# Patient Record
Sex: FEMALE | Race: WHITE | Employment: UNEMPLOYED | URBAN - METROPOLITAN AREA
[De-identification: names, ages, dates, MRNs, and addresses within clinical notes are randomized per-mention and may not be internally consistent; named-entity substitution may affect disease eponyms.]

---

## 2023-05-11 ENCOUNTER — OFFICE VISIT (OUTPATIENT)
Dept: PRIMARY CARE CLINIC | Age: 63
End: 2023-05-11

## 2023-05-11 VITALS
DIASTOLIC BLOOD PRESSURE: 90 MMHG | TEMPERATURE: 97.1 F | HEART RATE: 98 BPM | HEIGHT: 65 IN | SYSTOLIC BLOOD PRESSURE: 160 MMHG | OXYGEN SATURATION: 100 % | BODY MASS INDEX: 29.46 KG/M2 | WEIGHT: 176.8 LBS

## 2023-05-11 DIAGNOSIS — Z76.89 ENCOUNTER TO ESTABLISH CARE: Primary | ICD-10-CM

## 2023-05-11 DIAGNOSIS — G47.00 INSOMNIA, UNSPECIFIED TYPE: ICD-10-CM

## 2023-05-11 DIAGNOSIS — E11.65 UNCONTROLLED TYPE 2 DIABETES MELLITUS WITH HYPERGLYCEMIA (HCC): ICD-10-CM

## 2023-05-11 DIAGNOSIS — F41.9 ANXIETY: ICD-10-CM

## 2023-05-11 DIAGNOSIS — I10 ESSENTIAL HYPERTENSION: ICD-10-CM

## 2023-05-11 PROBLEM — Z79.4 DIABETES MELLITUS TYPE 2, INSULIN DEPENDENT (HCC): Status: ACTIVE | Noted: 2023-05-11

## 2023-05-11 PROBLEM — N20.0 LEFT NEPHROLITHIASIS: Status: ACTIVE | Noted: 2021-01-22

## 2023-05-11 PROBLEM — E11.9 DIABETES MELLITUS TYPE 2, INSULIN DEPENDENT (HCC): Status: ACTIVE | Noted: 2023-05-11

## 2023-05-11 PROCEDURE — 99203 OFFICE O/P NEW LOW 30 MIN: CPT | Performed by: FAMILY MEDICINE

## 2023-05-11 PROCEDURE — 3077F SYST BP >= 140 MM HG: CPT | Performed by: FAMILY MEDICINE

## 2023-05-11 PROCEDURE — 3080F DIAST BP >= 90 MM HG: CPT | Performed by: FAMILY MEDICINE

## 2023-05-11 RX ORDER — TRAZODONE HYDROCHLORIDE 50 MG/1
50 TABLET ORAL NIGHTLY
COMMUNITY

## 2023-05-11 RX ORDER — GLIMEPIRIDE 4 MG/1
4 TABLET ORAL
COMMUNITY

## 2023-05-11 RX ORDER — CETIRIZINE HYDROCHLORIDE 10 MG/1
10 TABLET ORAL DAILY
COMMUNITY

## 2023-05-11 RX ORDER — SIMVASTATIN 20 MG
20 TABLET ORAL NIGHTLY
COMMUNITY

## 2023-05-11 RX ORDER — AMITRIPTYLINE HYDROCHLORIDE 25 MG/1
25 TABLET, FILM COATED ORAL NIGHTLY PRN
Qty: 30 TABLET | Refills: 2
Start: 2023-05-11

## 2023-05-11 RX ORDER — HYDROCHLOROTHIAZIDE 12.5 MG/1
12.5 CAPSULE, GELATIN COATED ORAL EVERY MORNING
Qty: 30 CAPSULE | Refills: 1 | Status: SHIPPED | OUTPATIENT
Start: 2023-05-11

## 2023-05-11 SDOH — ECONOMIC STABILITY: INCOME INSECURITY: HOW HARD IS IT FOR YOU TO PAY FOR THE VERY BASICS LIKE FOOD, HOUSING, MEDICAL CARE, AND HEATING?: NOT HARD AT ALL

## 2023-05-11 SDOH — ECONOMIC STABILITY: HOUSING INSECURITY
IN THE LAST 12 MONTHS, WAS THERE A TIME WHEN YOU DID NOT HAVE A STEADY PLACE TO SLEEP OR SLEPT IN A SHELTER (INCLUDING NOW)?: NO

## 2023-05-11 SDOH — ECONOMIC STABILITY: FOOD INSECURITY: WITHIN THE PAST 12 MONTHS, YOU WORRIED THAT YOUR FOOD WOULD RUN OUT BEFORE YOU GOT MONEY TO BUY MORE.: NEVER TRUE

## 2023-05-11 SDOH — ECONOMIC STABILITY: FOOD INSECURITY: WITHIN THE PAST 12 MONTHS, THE FOOD YOU BOUGHT JUST DIDN'T LAST AND YOU DIDN'T HAVE MONEY TO GET MORE.: NEVER TRUE

## 2023-05-11 ASSESSMENT — ENCOUNTER SYMPTOMS
NAUSEA: 0
COUGH: 0
ABDOMINAL PAIN: 0
SHORTNESS OF BREATH: 0
VOMITING: 0
SORE THROAT: 0
DIARRHEA: 0

## 2023-05-11 ASSESSMENT — PATIENT HEALTH QUESTIONNAIRE - PHQ9
1. LITTLE INTEREST OR PLEASURE IN DOING THINGS: 0
SUM OF ALL RESPONSES TO PHQ QUESTIONS 1-9: 0
SUM OF ALL RESPONSES TO PHQ9 QUESTIONS 1 & 2: 0
2. FEELING DOWN, DEPRESSED OR HOPELESS: 0
SUM OF ALL RESPONSES TO PHQ QUESTIONS 1-9: 0

## 2023-05-11 NOTE — ASSESSMENT & PLAN NOTE
Uncontrolled, changes made today: A1c 14 on 5/11/2023   Discussed dietary changes with patient including soda  Continue with current regimen of NPH 20 units in the morning, metformin twice daily and Amaryl  Monitor sugars at least twice daily, log it and bring to next appointment  Follow-up in 1 month

## 2023-05-11 NOTE — ASSESSMENT & PLAN NOTE
Uncontrolled, changes made today: Continue lisinopril 40 mg daily, add hydrochlorothiazide 12.5 mg daily

## 2023-05-11 NOTE — PROGRESS NOTES
Gurpreet Swain (:  1960) is a 65 y.o. female,Established patient, here for evaluation of the following chief complaint(s):  New Patient and Diabetes      ASSESSMENT/PLAN:  1. Encounter to establish care  -     CBC with Auto Differential; Future  -     TSH with Reflex; Future  -     Comprehensive Metabolic Panel; Future  2. Essential hypertension  Assessment & Plan:   Uncontrolled, changes made today: Continue lisinopril 40 mg daily, add hydrochlorothiazide 12.5 mg daily  Orders:  -     Lipid Panel; Future  -     hydroCHLOROthiazide (MICROZIDE) 12.5 MG capsule; Take 1 capsule by mouth every morning, Disp-30 capsule, R-1Normal  3. Insomnia, unspecified type  Assessment & Plan:   Borderline controlled, continue current medications   Trazodone nightly    4. Anxiety  Assessment & Plan:   Well-controlled, continue current medications   Amitriptyline nightly as needed  Orders:  -     amitriptyline (ELAVIL) 25 MG tablet; Take 1 tablet by mouth nightly as needed (sleep/anxiety), Disp-30 tablet, R-2NO PRINT  5. Uncontrolled type 2 diabetes mellitus with hyperglycemia ([facility] [address]  Assessment & Plan:   Uncontrolled, changes made today: A1c 14 on 2023   Discussed dietary changes with patient including soda  Continue with current regimen of NPH 20 units in the morning, metformin twice daily and Amaryl  Monitor sugars at least twice daily, log it and bring to next appointment  Follow-up in 1 month  Orders:  -     metFORMIN (GLUCOPHAGE) 1000 MG tablet; Take 1 tablet by mouth 2 times daily (with meals) One tab daily. , Disp-60 tablet, R-2NO PRINT  -     insulin NPH (HUMULIN N;NOVOLIN N) 100 UNIT/ML injection vial; Inject 20 Units into the skin every morning 70/30 mixture., Disp-10 mL, R-1NO PRINT      Return in about 4 weeks (around 2023), or if symptoms worsen or fail to improve, for DM2, HTN.     SUBJECTIVE/OBJECTIVE:  HPI    Hypertension  -Currently on lisinopril 40 mg  -Blood pressure elevated 140/98, recheck

## 2023-05-19 LAB
ALBUMIN SERPL-MCNC: 4.3 G/DL
ALP BLD-CCNC: 109 U/L
ALT SERPL-CCNC: 20 U/L
ANION GAP SERPL CALCULATED.3IONS-SCNC: NORMAL MMOL/L
AST SERPL-CCNC: 19 U/L
BASOPHILS ABSOLUTE: 0.1 /ΜL
BASOPHILS RELATIVE PERCENT: 1 %
BILIRUB SERPL-MCNC: 0.2 MG/DL (ref 0.1–1.4)
BUN BLDV-MCNC: 13 MG/DL
CALCIUM SERPL-MCNC: 9.5 MG/DL
CHLORIDE BLD-SCNC: 98 MMOL/L
CHOLESTEROL, TOTAL: 196 MG/DL
CHOLESTEROL/HDL RATIO: NORMAL
CO2: NORMAL
CREAT SERPL-MCNC: 0.61 MG/DL
EGFR: 102
EOSINOPHILS ABSOLUTE: 0.2 /ΜL
EOSINOPHILS RELATIVE PERCENT: 2 %
GLUCOSE BLD-MCNC: 88 MG/DL
HCT VFR BLD CALC: 41.8 % (ref 36–46)
HCT VFR BLD CALC: 41.8 % (ref 36–46)
HDLC SERPL-MCNC: 36 MG/DL (ref 35–70)
HEMOGLOBIN: 13.1 G/DL (ref 12–16)
HEMOGLOBIN: 13.1 G/DL (ref 12–16)
LDL CHOLESTEROL CALCULATED: 116 MG/DL (ref 0–160)
LYMPHOCYTES ABSOLUTE: 2.9 /ΜL
LYMPHOCYTES RELATIVE PERCENT: 22 %
MCH RBC QN AUTO: 24.7 PG
MCHC RBC AUTO-ENTMCNC: 31.3 G/DL
MCV RBC AUTO: 79 FL
MONOCYTES ABSOLUTE: 0.7 /ΜL
MONOCYTES RELATIVE PERCENT: 6 %
NEUTROPHILS ABSOLUTE: 9.3 /ΜL
NEUTROPHILS RELATIVE PERCENT: 69 %
NONHDLC SERPL-MCNC: NORMAL MG/DL
PLATELET # BLD: 240 K/ΜL
PLATELET # BLD: 240 K/ΜL
PMV BLD AUTO: ABNORMAL FL
POTASSIUM SERPL-SCNC: 4.5 MMOL/L
RBC # BLD: 5.3 10^6/ΜL
SODIUM BLD-SCNC: 136 MMOL/L
TOTAL PROTEIN: 7.5
TRIGL SERPL-MCNC: 249 MG/DL
VLDLC SERPL CALC-MCNC: 44 MG/DL
WBC # BLD: 13.4 10^3/ML
WBC # BLD: 13.4 10^3/ML

## 2023-05-23 ENCOUNTER — PATIENT MESSAGE (OUTPATIENT)
Dept: PRIMARY CARE CLINIC | Age: 63
End: 2023-05-23

## 2023-05-23 DIAGNOSIS — I10 ESSENTIAL HYPERTENSION: ICD-10-CM

## 2023-05-24 RX ORDER — HYDROCHLOROTHIAZIDE 12.5 MG/1
25 CAPSULE, GELATIN COATED ORAL EVERY MORNING
Qty: 30 CAPSULE | Refills: 1
Start: 2023-05-24

## 2023-05-24 NOTE — TELEPHONE ENCOUNTER
From: Yakelin Verma  To: Dr. Heri Lofton  Sent: 5/23/2023 8:40 PM EDT  Subject: BP    Hey Dr Celestino Henning,    My BP was 230/127 w/  @1936. At the moment I felt chest tightness, sweating, and nausea. @1950 BP was 205/134 . BP @2016 (post hydration and cat) 165/109 HR 97  BP @2018 161/103 HR98     BP @2016 & 2018 were taken with two different wrist BP cuffs. Manual BP in [facility] was 164/96     I had taken my PM pills as well beforehand (Glimperide, Metformin, OTC Allergy). my BG this morning was 91, currently am at 227. i did take insulin around 1845. I did eat potato wedges around 1600. I did not get to eat lunch, but did eat breakfast.     since this medication change, my BP has been elevated. it is usually 190/110 average. please advise on next steps. [facility]) I was advised about ER due to chest tightness and HTN and refused. It has gone away and now just feels like butterflies but mainly gone.  please call me with suggestions or contact Ana     thank you,  Dash Burciaga

## 2023-06-08 ENCOUNTER — OFFICE VISIT (OUTPATIENT)
Dept: PRIMARY CARE CLINIC | Age: 63
End: 2023-06-08

## 2023-06-08 ENCOUNTER — TELEPHONE (OUTPATIENT)
Dept: PRIMARY CARE CLINIC | Age: 63
End: 2023-06-08

## 2023-06-08 VITALS
TEMPERATURE: 97 F | BODY MASS INDEX: 30.52 KG/M2 | HEART RATE: 90 BPM | DIASTOLIC BLOOD PRESSURE: 88 MMHG | SYSTOLIC BLOOD PRESSURE: 136 MMHG | OXYGEN SATURATION: 97 % | WEIGHT: 180.6 LBS

## 2023-06-08 DIAGNOSIS — I10 ESSENTIAL HYPERTENSION: Primary | ICD-10-CM

## 2023-06-08 DIAGNOSIS — R07.9 CHEST PAIN, UNSPECIFIED TYPE: ICD-10-CM

## 2023-06-08 DIAGNOSIS — G47.00 INSOMNIA, UNSPECIFIED TYPE: ICD-10-CM

## 2023-06-08 DIAGNOSIS — L30.9 DERMATITIS: Primary | ICD-10-CM

## 2023-06-08 DIAGNOSIS — E11.65 UNCONTROLLED TYPE 2 DIABETES MELLITUS WITH HYPERGLYCEMIA (HCC): ICD-10-CM

## 2023-06-08 DIAGNOSIS — I10 ESSENTIAL HYPERTENSION: ICD-10-CM

## 2023-06-08 DIAGNOSIS — L30.9 DERMATITIS: ICD-10-CM

## 2023-06-08 DIAGNOSIS — E78.5 HYPERLIPIDEMIA, UNSPECIFIED HYPERLIPIDEMIA TYPE: ICD-10-CM

## 2023-06-08 PROCEDURE — 93000 ELECTROCARDIOGRAM COMPLETE: CPT | Performed by: FAMILY MEDICINE

## 2023-06-08 PROCEDURE — 3079F DIAST BP 80-89 MM HG: CPT | Performed by: FAMILY MEDICINE

## 2023-06-08 PROCEDURE — 99214 OFFICE O/P EST MOD 30 MIN: CPT | Performed by: FAMILY MEDICINE

## 2023-06-08 PROCEDURE — 3075F SYST BP GE 130 - 139MM HG: CPT | Performed by: FAMILY MEDICINE

## 2023-06-08 RX ORDER — METHYLPREDNISOLONE 4 MG/1
TABLET ORAL
Qty: 1 KIT | Refills: 0 | Status: SHIPPED | OUTPATIENT
Start: 2023-06-08 | End: 2023-06-14

## 2023-06-08 RX ORDER — SIMVASTATIN 20 MG
20 TABLET ORAL NIGHTLY
Qty: 30 TABLET | Refills: 5 | Status: SHIPPED | OUTPATIENT
Start: 2023-06-08

## 2023-06-08 RX ORDER — LISINOPRIL AND HYDROCHLOROTHIAZIDE 20; 12.5 MG/1; MG/1
2 TABLET ORAL DAILY
Qty: 60 TABLET | Refills: 5 | Status: SHIPPED | OUTPATIENT
Start: 2023-06-08

## 2023-06-08 RX ORDER — TRAZODONE HYDROCHLORIDE 50 MG/1
50-100 TABLET ORAL NIGHTLY
Qty: 60 TABLET | Refills: 5 | Status: SHIPPED | OUTPATIENT
Start: 2023-06-08

## 2023-06-08 RX ORDER — MUPIROCIN CALCIUM 20 MG/G
CREAM TOPICAL
Qty: 15 G | Refills: 0 | Status: SHIPPED
Start: 2023-06-08 | End: 2023-06-08 | Stop reason: ALTCHOICE

## 2023-06-08 ASSESSMENT — ENCOUNTER SYMPTOMS
EYE DISCHARGE: 0
ABDOMINAL PAIN: 0
SHORTNESS OF BREATH: 0
SORE THROAT: 0
DIARRHEA: 0
NAUSEA: 0
COUGH: 0
EYE ITCHING: 0
TROUBLE SWALLOWING: 0
VOMITING: 0

## 2023-06-08 NOTE — ASSESSMENT & PLAN NOTE
Borderline controlled, changes made today: Increase trazodone to 1.5 to 2 tablets nightly as needed for insomnia

## 2023-06-08 NOTE — TELEPHONE ENCOUNTER
Pharmacy called and stated that the mupirocin (BACTROBAN) 2 % cream  isn't covered by insurance and is very expensive but the ointment is inexpensive     Pharmacy wants to know if it can be switched

## 2023-06-08 NOTE — PROGRESS NOTES
also taking MV  -Pressure readings since starting lisinopril/hydrochlorothiazide combo is 136/94, 138/97, 157/92 and today 136/88  -Overall blood pressure seems to be stable    Diabetes mellitus type 2  -On metformin 1002 times daily and NPH 20 units every morning  -Patient took it at night 1 time and her morning reading was 55  -Advised to only take it in the morning and blood sugars have been running good, 79, 127, 117, 212, 126, 197, 139  -1 difficult to take her medication-blood sugars were more than 200    Hyperlipidemia  -Currently on Zocor  -ASCVD risk score is 15%  -Discussed seeing if she needs to increase the  -As she has been having \"chest discomfort\"  -Discussed EKG and she is agreeable    Rash of the legs  -Was wearing capris when mowing lawn  -Rash of the lower legs bilaterally  -Applied hydrocortisone cream but not improving    Insomnia  -Elavil not helping, stopped  -Trazodone helping to some extent but wondering if she can go up on the dose      Review of Systems   Constitutional:  Negative for appetite change, chills, fatigue and fever. HENT:  Negative for congestion, ear pain, sneezing, sore throat and trouble swallowing. Eyes:  Negative for discharge and itching. Respiratory:  Negative for cough and shortness of breath. Cardiovascular:  Positive for chest pain. Negative for leg swelling. Gastrointestinal:  Negative for abdominal pain, diarrhea, nausea and vomiting. Genitourinary:  Negative for dysuria and urgency. Musculoskeletal:  Negative for joint swelling and neck pain. Skin:  Positive for rash. Neurological:  Negative for light-headedness and headaches. Psychiatric/Behavioral:  Negative for dysphoric mood. The patient is not nervous/anxious. Physical Exam  Constitutional:       General: She is not in acute distress. Appearance: Normal appearance. HENT:      Head: Normocephalic and atraumatic. Nose: Nose normal. No congestion or rhinorrhea.    Eyes:

## 2023-06-08 NOTE — ASSESSMENT & PLAN NOTE
Well-controlled, continue current medications   Lisinopril 20/hydrochlorothiazide 12.5, 2 tablets daily  Blood pressure stable

## 2023-06-09 RX ORDER — LISINOPRIL AND HYDROCHLOROTHIAZIDE 20; 12.5 MG/1; MG/1
2 TABLET ORAL DAILY
Qty: 60 TABLET | Refills: 5 | OUTPATIENT
Start: 2023-06-09

## 2023-06-26 ENCOUNTER — TELEPHONE (OUTPATIENT)
Dept: PRIMARY CARE CLINIC | Age: 63
End: 2023-06-26

## 2023-09-08 ENCOUNTER — OFFICE VISIT (OUTPATIENT)
Dept: PRIMARY CARE CLINIC | Age: 63
End: 2023-09-08

## 2023-09-08 VITALS
BODY MASS INDEX: 31.2 KG/M2 | TEMPERATURE: 97.8 F | HEART RATE: 81 BPM | SYSTOLIC BLOOD PRESSURE: 124 MMHG | OXYGEN SATURATION: 97 % | WEIGHT: 184.6 LBS | DIASTOLIC BLOOD PRESSURE: 86 MMHG

## 2023-09-08 DIAGNOSIS — I10 ESSENTIAL HYPERTENSION: ICD-10-CM

## 2023-09-08 DIAGNOSIS — J06.9 UPPER RESPIRATORY TRACT INFECTION, UNSPECIFIED TYPE: ICD-10-CM

## 2023-09-08 DIAGNOSIS — G47.00 INSOMNIA, UNSPECIFIED TYPE: ICD-10-CM

## 2023-09-08 DIAGNOSIS — E11.65 UNCONTROLLED TYPE 2 DIABETES MELLITUS WITH HYPERGLYCEMIA (HCC): Primary | ICD-10-CM

## 2023-09-08 LAB — HBA1C MFR BLD: 8.5 %

## 2023-09-08 PROCEDURE — 83036 HEMOGLOBIN GLYCOSYLATED A1C: CPT | Performed by: FAMILY MEDICINE

## 2023-09-08 PROCEDURE — 99214 OFFICE O/P EST MOD 30 MIN: CPT | Performed by: FAMILY MEDICINE

## 2023-09-08 PROCEDURE — 3079F DIAST BP 80-89 MM HG: CPT | Performed by: FAMILY MEDICINE

## 2023-09-08 PROCEDURE — 3074F SYST BP LT 130 MM HG: CPT | Performed by: FAMILY MEDICINE

## 2023-09-08 PROCEDURE — 3046F HEMOGLOBIN A1C LEVEL >9.0%: CPT | Performed by: FAMILY MEDICINE

## 2023-09-08 RX ORDER — GLIMEPIRIDE 4 MG/1
4 TABLET ORAL
Qty: 30 TABLET | Refills: 5 | Status: SHIPPED | OUTPATIENT
Start: 2023-09-08

## 2023-09-08 RX ORDER — SIMVASTATIN 20 MG
20 TABLET ORAL NIGHTLY
Qty: 30 TABLET | Refills: 5 | Status: SHIPPED | OUTPATIENT
Start: 2023-09-08

## 2023-09-08 RX ORDER — AZITHROMYCIN 250 MG/1
250 TABLET, FILM COATED ORAL SEE ADMIN INSTRUCTIONS
Qty: 6 TABLET | Refills: 0 | Status: SHIPPED | OUTPATIENT
Start: 2023-09-08 | End: 2023-09-13

## 2023-09-08 ASSESSMENT — ENCOUNTER SYMPTOMS
EYE DISCHARGE: 0
SORE THROAT: 1
ABDOMINAL PAIN: 0
TROUBLE SWALLOWING: 0
COUGH: 1
NAUSEA: 0
DIARRHEA: 0
VOMITING: 0
SHORTNESS OF BREATH: 0
EYE ITCHING: 0

## 2023-09-08 NOTE — PROGRESS NOTES
Montserrat Bledsoe (:  1960) is a 59 y.o. female,Established patient, here for evaluation of the following chief complaint(s):  Congestion, Sinus Problem, and Follow-up      ASSESSMENT/PLAN:  1. Uncontrolled type 2 diabetes mellitus with hyperglycemia (HCC)  -     POCT glycosylated hemoglobin (Hb A1C)  -     insulin NPH (HUMULIN N;NOVOLIN N) 100 UNIT/ML injection vial; Inject 20 Units into the skin every evening 70/30 mixture., Disp-10 mL, R-3NO PRINT  -     metFORMIN (GLUCOPHAGE) 1000 MG tablet; Take 1 tablet by mouth 2 times daily (with meals) One tab daily. , Disp-60 tablet, R-5Normal  -     glimepiride (AMARYL) 4 MG tablet; Take 1 tablet by mouth every morning (before breakfast), Disp-30 tablet, R-5Normal  -     simvastatin (ZOCOR) 20 MG tablet; Take 1 tablet by mouth nightly, Disp-30 tablet, R-5Normal  2. Essential hypertension  3. Insomnia, unspecified type  4. Upper respiratory tract infection, unspecified type  -     azithromycin (ZITHROMAX) 250 MG tablet; Take 1 tablet by mouth See Admin Instructions for 5 days 500mg on day 1 followed by 250mg on days 2 - 5, Disp-6 tablet, R-0Normal      Return in about 3 months (around 2023), or if symptoms worsen or fail to improve, for DM2. SUBJECTIVE/OBJECTIVE:  HPI    DM  Type 2 Follow up   - A1c 2023 14  - A1c 2023 8.5  - on NPH, metformin and amaryl   - mostly in the 70s - 80s    HTN  - lisinopril/hctz  - stable 124/26  - 118/80, 128/87, 126/86, 113/76, 121/81, 118/78    Insomnia  - trazodone helping    Allergies  - having trouble sleeping with allergies  - takes zyrtec which might not help  - nasal spray - at night  - usually around this time of the year  - not wheezing       Review of Systems   Constitutional:  Negative for appetite change, chills, fatigue and fever. HENT:  Positive for congestion, ear pain and sore throat. Negative for sneezing and trouble swallowing. Facial swelling: \"ears full\".    Eyes:  Negative for discharge and

## 2023-09-20 ENCOUNTER — TELEPHONE (OUTPATIENT)
Dept: PRIMARY CARE CLINIC | Age: 63
End: 2023-09-20

## 2023-09-20 ENCOUNTER — PATIENT MESSAGE (OUTPATIENT)
Dept: PRIMARY CARE CLINIC | Age: 63
End: 2023-09-20

## 2023-09-20 NOTE — TELEPHONE ENCOUNTER
Pt called in and stated her ear has been bothering her persistently since yesterday. Since LOV, ear has continued to bother her. Over the weekend, she cleaned it with hydrogen peroxide, and a lot of buildup came out. She reported feeling better after that. Yesterday and today her ear felt \"like being in a tunnel. \". C/o dizziness, and 's, currently 176. When she took OTC Allergy meds, her BG elevated over the weekend. Last night she showered, and some water got in her ear and it caused excessive pain. Pt complains of ear being swollen and aching, accompanied with dizziness/nausea. please advise.  CB #[phone]

## 2023-09-20 NOTE — TELEPHONE ENCOUNTER
From: Yainck Mayes  To: Dr. Portillo Bennett: 9/20/2023 9:20 AM EDT  Subject: ears    can you give me more antibiotics or do you want me to come in  ears sound like im in a tunnel and right one hurts really bad, itchy nauseous dizzy

## 2023-09-21 ENCOUNTER — OFFICE VISIT (OUTPATIENT)
Dept: PRIMARY CARE CLINIC | Age: 63
End: 2023-09-21

## 2023-09-21 VITALS
DIASTOLIC BLOOD PRESSURE: 84 MMHG | WEIGHT: 185.2 LBS | OXYGEN SATURATION: 96 % | RESPIRATION RATE: 18 BRPM | SYSTOLIC BLOOD PRESSURE: 122 MMHG | BODY MASS INDEX: 31.3 KG/M2 | TEMPERATURE: 98 F | HEART RATE: 86 BPM

## 2023-09-21 DIAGNOSIS — G89.29 CHRONIC RIGHT EAR PAIN: Primary | ICD-10-CM

## 2023-09-21 DIAGNOSIS — H92.01 CHRONIC RIGHT EAR PAIN: Primary | ICD-10-CM

## 2023-09-21 DIAGNOSIS — J30.9 ALLERGIC RHINITIS, UNSPECIFIED SEASONALITY, UNSPECIFIED TRIGGER: ICD-10-CM

## 2023-09-21 PROCEDURE — 3079F DIAST BP 80-89 MM HG: CPT | Performed by: FAMILY MEDICINE

## 2023-09-21 PROCEDURE — 3074F SYST BP LT 130 MM HG: CPT | Performed by: FAMILY MEDICINE

## 2023-09-21 PROCEDURE — 99213 OFFICE O/P EST LOW 20 MIN: CPT | Performed by: FAMILY MEDICINE

## 2023-09-21 ASSESSMENT — ENCOUNTER SYMPTOMS
COUGH: 0
SORE THROAT: 0
VOMITING: 0
DIARRHEA: 0
NAUSEA: 0
ABDOMINAL PAIN: 0
SHORTNESS OF BREATH: 0
EYE ITCHING: 0
TROUBLE SWALLOWING: 0
EYE DISCHARGE: 0

## 2023-09-21 NOTE — PROGRESS NOTES
Keren Tamez (:  1960) is a 59 y.o. female,Established patient, here for evaluation of the following chief complaint(s):  Otalgia and Sinusitis (Suspected sinusitis. Usually from September through  [facility]. )      ASSESSMENT/PLAN:  1. Chronic right ear pain  Assessment & Plan:   Uncontrolled, Already using Flonase and Zyrtec not helping. Z-Gerry did not help. Trial of Arctic drops with hydrocortisone to see if this helps   If no improvement go ahead and make the ENT appointment, referral given to patient  Orders:  -     neomycin-polymyxin-hydrocortisone (CORTISPORIN) 3.5-08820-9 otic solution; Place 4 drops into the right ear 3 times daily for 7 days Instill into Right Ear, Disp-4 mL, R-0Normal  -     [facility] [place]  2. Allergic rhinitis, unspecified seasonality, unspecified trigger  -     neomycin-polymyxin-hydrocortisone (CORTISPORIN) 3.5-54029-7 otic solution; Place 4 drops into the right ear 3 times daily for 7 days Instill into Right Ear, Disp-4 mL, R-0Normal  -     [facility]      No follow-ups on file. SUBJECTIVE/OBJECTIVE:  HPI    Right Ear Pain  Allergies  - this is chronic  - was treated with Abx  (z pack)  - feels that she has these symptoms through this season and usually improves when she visits [place]  - has never been referred to an ENT in the past  - currently on zyrtec and Flonase daily  -Is that Vicks vapor rub might help and she is going to try that  -Also been trying the [place] pot which has not made a difference  -Taking Aleve at night to help with ear pain    Review of Systems   Constitutional:  Negative for appetite change, chills, fatigue and fever. HENT:  Positive for ear pain. Negative for congestion, sneezing, sore throat and trouble swallowing. Eyes:  Negative for discharge and itching. Respiratory:  Negative for cough and shortness of breath.     Cardiovascular:  Negative

## 2023-09-21 NOTE — ASSESSMENT & PLAN NOTE
Uncontrolled, Already using Flonase and Zyrtec not helping. Z-Gerry did not help.   Trial of Arctic drops with hydrocortisone to see if this helps   If no improvement go ahead and make the ENT appointment, referral given to patient

## 2023-10-23 ENCOUNTER — OFFICE VISIT (OUTPATIENT)
Dept: ENT CLINIC | Age: 63
End: 2023-10-23

## 2023-10-23 VITALS
BODY MASS INDEX: 30.99 KG/M2 | WEIGHT: 186 LBS | SYSTOLIC BLOOD PRESSURE: 160 MMHG | DIASTOLIC BLOOD PRESSURE: 81 MMHG | TEMPERATURE: 97.1 F | OXYGEN SATURATION: 98 % | HEIGHT: 65 IN | HEART RATE: 88 BPM

## 2023-10-23 DIAGNOSIS — H60.61 CHRONIC OTITIS EXTERNA OF RIGHT EAR, UNSPECIFIED TYPE: ICD-10-CM

## 2023-10-23 DIAGNOSIS — E04.1 LEFT THYROID NODULE: ICD-10-CM

## 2023-10-23 DIAGNOSIS — H91.91 HEARING LOSS OF RIGHT EAR, UNSPECIFIED HEARING LOSS TYPE: ICD-10-CM

## 2023-10-23 DIAGNOSIS — H65.91 RIGHT OTITIS MEDIA WITH EFFUSION: Primary | ICD-10-CM

## 2023-10-23 PROCEDURE — 99204 OFFICE O/P NEW MOD 45 MIN: CPT | Performed by: STUDENT IN AN ORGANIZED HEALTH CARE EDUCATION/TRAINING PROGRAM

## 2023-10-23 PROCEDURE — 3079F DIAST BP 80-89 MM HG: CPT | Performed by: STUDENT IN AN ORGANIZED HEALTH CARE EDUCATION/TRAINING PROGRAM

## 2023-10-23 PROCEDURE — 31231 NASAL ENDOSCOPY DX: CPT | Performed by: STUDENT IN AN ORGANIZED HEALTH CARE EDUCATION/TRAINING PROGRAM

## 2023-10-23 PROCEDURE — 3077F SYST BP >= 140 MM HG: CPT | Performed by: STUDENT IN AN ORGANIZED HEALTH CARE EDUCATION/TRAINING PROGRAM

## 2023-10-23 RX ORDER — PREDNISONE 20 MG/1
20 TABLET ORAL 2 TIMES DAILY
Qty: 10 TABLET | Refills: 0 | Status: SHIPPED | OUTPATIENT
Start: 2023-10-23 | End: 2023-10-28

## 2023-10-23 NOTE — PROGRESS NOTES
Otolaryngology/Head & Neck Surgery  10/23/23    Medical Decision Making: The following items were considered in medical decision making:  Independent review of images  Review / order clinical lab tests  Review / order radiology tests  Decision to obtain old records    This note was generated completely or in part utilizing Dragon dictation speech recognition software. Occasionally, words are mistranscribed and despite editing, the text may contain inaccuracies due to incorrect word recognition. If further clarification is needed please contact the office at [phone].

## 2024-01-16 DIAGNOSIS — G47.00 INSOMNIA, UNSPECIFIED TYPE: ICD-10-CM

## 2024-01-16 RX ORDER — TRAZODONE HYDROCHLORIDE 50 MG/1
50-100 TABLET ORAL NIGHTLY
Qty: 60 TABLET | Refills: 1 | Status: SHIPPED | OUTPATIENT
Start: 2024-01-16

## 2024-01-16 NOTE — TELEPHONE ENCOUNTER
Medication:   Requested Prescriptions     Pending Prescriptions Disp Refills    traZODone (DESYREL) 50 MG tablet 60 tablet 5     Sig: Take 1-2 tablets by mouth nightly     Last Filled:  6.8.23    Last appt: 9/21/2023   Next appt: Visit date not found    Last OARRS:        No data to display

## 2024-04-29 LAB — MAMMOGRAPHY, EXTERNAL: NORMAL
